# Patient Record
Sex: FEMALE | Race: WHITE
[De-identification: names, ages, dates, MRNs, and addresses within clinical notes are randomized per-mention and may not be internally consistent; named-entity substitution may affect disease eponyms.]

---

## 2018-07-27 ENCOUNTER — HOSPITAL ENCOUNTER (INPATIENT)
Dept: HOSPITAL 96 - M.ERS | Age: 43
LOS: 2 days | Discharge: HOME | DRG: 177 | End: 2018-07-29
Attending: INTERNAL MEDICINE | Admitting: INTERNAL MEDICINE
Payer: MEDICARE

## 2018-07-27 VITALS — SYSTOLIC BLOOD PRESSURE: 90 MMHG | DIASTOLIC BLOOD PRESSURE: 52 MMHG

## 2018-07-27 VITALS — DIASTOLIC BLOOD PRESSURE: 49 MMHG | SYSTOLIC BLOOD PRESSURE: 93 MMHG

## 2018-07-27 VITALS — DIASTOLIC BLOOD PRESSURE: 34 MMHG | SYSTOLIC BLOOD PRESSURE: 114 MMHG

## 2018-07-27 VITALS — HEIGHT: 62.01 IN | WEIGHT: 136 LBS | BODY MASS INDEX: 24.71 KG/M2

## 2018-07-27 VITALS — SYSTOLIC BLOOD PRESSURE: 99 MMHG | DIASTOLIC BLOOD PRESSURE: 46 MMHG

## 2018-07-27 VITALS — SYSTOLIC BLOOD PRESSURE: 138 MMHG | DIASTOLIC BLOOD PRESSURE: 64 MMHG

## 2018-07-27 DIAGNOSIS — Z98.1: ICD-10-CM

## 2018-07-27 DIAGNOSIS — Z87.01: ICD-10-CM

## 2018-07-27 DIAGNOSIS — Z88.8: ICD-10-CM

## 2018-07-27 DIAGNOSIS — E53.8: ICD-10-CM

## 2018-07-27 DIAGNOSIS — G89.29: ICD-10-CM

## 2018-07-27 DIAGNOSIS — Z90.49: ICD-10-CM

## 2018-07-27 DIAGNOSIS — Z88.1: ICD-10-CM

## 2018-07-27 DIAGNOSIS — R65.11: ICD-10-CM

## 2018-07-27 DIAGNOSIS — M47.9: ICD-10-CM

## 2018-07-27 DIAGNOSIS — Z79.2: ICD-10-CM

## 2018-07-27 DIAGNOSIS — J44.0: ICD-10-CM

## 2018-07-27 DIAGNOSIS — Z88.6: ICD-10-CM

## 2018-07-27 DIAGNOSIS — F17.210: ICD-10-CM

## 2018-07-27 DIAGNOSIS — M21.371: ICD-10-CM

## 2018-07-27 DIAGNOSIS — Z91.14: ICD-10-CM

## 2018-07-27 DIAGNOSIS — Z79.899: ICD-10-CM

## 2018-07-27 DIAGNOSIS — Z79.01: ICD-10-CM

## 2018-07-27 DIAGNOSIS — G62.9: ICD-10-CM

## 2018-07-27 DIAGNOSIS — Z86.718: ICD-10-CM

## 2018-07-27 DIAGNOSIS — M21.372: ICD-10-CM

## 2018-07-27 DIAGNOSIS — K21.9: ICD-10-CM

## 2018-07-27 DIAGNOSIS — E87.6: ICD-10-CM

## 2018-07-27 DIAGNOSIS — J15.6: Primary | ICD-10-CM

## 2018-07-27 DIAGNOSIS — J96.21: ICD-10-CM

## 2018-07-27 LAB
ABSOLUTE BASOPHILS: 0.1 THOU/UL (ref 0–0.2)
ABSOLUTE EOSINOPHILS: 0.2 THOU/UL (ref 0–0.7)
ABSOLUTE MONOCYTES: 0.3 THOU/UL (ref 0–1.2)
ALBUMIN SERPL-MCNC: 2.8 G/DL (ref 3.4–5)
ALP SERPL-CCNC: 85 U/L (ref 46–116)
ALT SERPL-CCNC: 18 U/L (ref 30–65)
ANION GAP SERPL CALC-SCNC: 6 MMOL/L (ref 7–16)
AST SERPL-CCNC: 330 U/L (ref 15–37)
BASOPHILS NFR BLD AUTO: 0.5 %
BILIRUB SERPL-MCNC: 0.4 MG/DL
BILIRUB UR-MCNC: NEGATIVE MG/DL
BUN SERPL-MCNC: 10 MG/DL (ref 7–18)
CALCIUM SERPL-MCNC: 9.3 MG/DL (ref 8.5–10.1)
CHLORIDE SERPL-SCNC: 103 MMOL/L (ref 98–107)
CO2 SERPL-SCNC: 31 MMOL/L (ref 21–32)
COLOR UR: YELLOW
CREAT SERPL-MCNC: 0.8 MG/DL (ref 0.6–1.3)
EOSINOPHIL NFR BLD: 1.8 %
GLUCOSE SERPL-MCNC: 112 MG/DL (ref 70–99)
GRANULOCYTES NFR BLD MANUAL: 68.3 %
HCT VFR BLD CALC: 39.1 % (ref 37–47)
HGB BLD-MCNC: 13 GM/DL (ref 12–15)
INR PPP: 1
KETONES UR STRIP-MCNC: NEGATIVE MG/DL
LYMPHOCYTES # BLD: 2.8 THOU/UL (ref 0.8–5.3)
LYMPHOCYTES NFR BLD AUTO: 26.2 %
MCH RBC QN AUTO: 32.5 PG (ref 26–34)
MCHC RBC AUTO-ENTMCNC: 33.4 G/DL (ref 28–37)
MCV RBC: 97.5 FL (ref 80–100)
MONOCYTES NFR BLD: 3.2 %
MPV: 8.8 FL. (ref 7.2–11.1)
NEUTROPHILS # BLD: 7.3 THOU/UL (ref 1.6–8.1)
NUCLEATED RBCS: 0 /100WBC
OPIATES UR-MCNC: POSITIVE NG/ML
PLATELET COUNT*: 177 THOU/UL (ref 150–400)
POTASSIUM SERPL-SCNC: 2.4 MMOL/L (ref 3.5–5.1)
PROT SERPL-MCNC: 6.6 G/DL (ref 6.4–8.2)
PROT UR QL STRIP: (no result)
PROTHROMBIN TIME: 9.4 SECONDS (ref 9.2–11.5)
RBC # BLD AUTO: 4.01 MIL/UL (ref 4.2–5)
RBC # UR STRIP: (no result) /UL
RBC #/AREA URNS HPF: (no result) /HPF (ref 0–2)
RDW-CV: 15.5 % (ref 10.5–14.5)
SODIUM SERPL-SCNC: 140 MMOL/L (ref 136–145)
SP GR UR STRIP: <= 1.005 (ref 1–1.03)
SQUAMOUS: (no result) /LPF (ref 0–3)
THC: POSITIVE
TROPONIN-I LEVEL: <0.06 NG/ML (ref ?–0.06)
URINE CLARITY: CLEAR
URINE GLUCOSE-RANDOM: NEGATIVE
URINE LEUKOCYTES-REFLEX: NEGATIVE
URINE NITRITE-REFLEX: NEGATIVE
URINE WBC-REFLEX: (no result) /HPF (ref 0–5)
UROBILINOGEN UR STRIP-ACNC: 0.2 E.U./DL (ref 0.2–1)
WBC # BLD AUTO: 10.7 THOU/UL (ref 4–11)

## 2018-07-27 NOTE — NUR
O2 SAT WAS 85-88% ON ROOM AIR.  PT HAS NOT BEEN ON O2 AT HOME "IN A FEW DAYS". 
 NOW REPORTS THAT SHE IS SUPPOSED TO BE ON O2 DURING THE DAY FREQUENTLY, BUT
HAS CHANGED INSURANCE & HAS NO PRIMARY CARE PHYSICIAN TO ORDER OXYGEN.  STATES
SHE STILL SMOKES.  O2 4L NC APPLIED & NOW O2 SAT 92%.

## 2018-07-27 NOTE — NUR
PT ARRIVED ON THE FLOOR FROM ER. VITALS TAKEN AND CARDIAC MONITER ON. PT WANTS
TO FILL OUT AN ADVANCE DIRECTIVE. STEPHANY FROM CT CALLED AND WANTS PT DOWN FOR
STAT CT PER DR LACHMAN.

## 2018-07-27 NOTE — NUR
TO  BATHROOM VIA W/C.  ASSISTED TO TOILET.  INSTRUCTED ON URINE COLLECTION &
CALL LIGHT USE.  VERBALIZED UNDERSTANDING.

## 2018-07-28 VITALS — DIASTOLIC BLOOD PRESSURE: 68 MMHG | SYSTOLIC BLOOD PRESSURE: 123 MMHG

## 2018-07-28 VITALS — SYSTOLIC BLOOD PRESSURE: 94 MMHG | DIASTOLIC BLOOD PRESSURE: 50 MMHG

## 2018-07-28 VITALS — SYSTOLIC BLOOD PRESSURE: 93 MMHG | DIASTOLIC BLOOD PRESSURE: 40 MMHG

## 2018-07-28 VITALS — SYSTOLIC BLOOD PRESSURE: 85 MMHG | DIASTOLIC BLOOD PRESSURE: 40 MMHG

## 2018-07-28 VITALS — SYSTOLIC BLOOD PRESSURE: 89 MMHG | DIASTOLIC BLOOD PRESSURE: 47 MMHG

## 2018-07-28 VITALS — DIASTOLIC BLOOD PRESSURE: 64 MMHG | SYSTOLIC BLOOD PRESSURE: 99 MMHG

## 2018-07-28 LAB
ABSOLUTE BASOPHILS: 0 THOU/UL (ref 0–0.2)
ABSOLUTE EOSINOPHILS: 0.3 THOU/UL (ref 0–0.7)
ABSOLUTE MONOCYTES: 0.4 THOU/UL (ref 0–1.2)
ANION GAP SERPL CALC-SCNC: 7 MMOL/L (ref 7–16)
BASOPHILS NFR BLD AUTO: 0.4 %
BUN SERPL-MCNC: 9 MG/DL (ref 7–18)
CALCIUM SERPL-MCNC: 8.2 MG/DL (ref 8.5–10.1)
CHLORIDE SERPL-SCNC: 107 MMOL/L (ref 98–107)
CO2 SERPL-SCNC: 28 MMOL/L (ref 21–32)
CREAT SERPL-MCNC: 0.7 MG/DL (ref 0.6–1.3)
EOSINOPHIL NFR BLD: 3.2 %
GLUCOSE SERPL-MCNC: 115 MG/DL (ref 70–99)
GRANULOCYTES NFR BLD MANUAL: 63.4 %
HCT VFR BLD CALC: 37.6 % (ref 37–47)
HGB BLD-MCNC: 12.3 GM/DL (ref 12–15)
LYMPHOCYTES # BLD: 3.1 THOU/UL (ref 0.8–5.3)
LYMPHOCYTES NFR BLD AUTO: 29.2 %
MCH RBC QN AUTO: 31.8 PG (ref 26–34)
MCHC RBC AUTO-ENTMCNC: 32.6 G/DL (ref 28–37)
MCV RBC: 97.4 FL (ref 80–100)
MONOCYTES NFR BLD: 3.8 %
MPV: 9.3 FL. (ref 7.2–11.1)
NEUTROPHILS # BLD: 6.7 THOU/UL (ref 1.6–8.1)
NUCLEATED RBCS: 0 /100WBC
PLATELET COUNT*: 153 THOU/UL (ref 150–400)
POTASSIUM SERPL-SCNC: 3.5 MMOL/L (ref 3.5–5.1)
RBC # BLD AUTO: 3.86 MIL/UL (ref 4.2–5)
RDW-CV: 15.7 % (ref 10.5–14.5)
SODIUM SERPL-SCNC: 142 MMOL/L (ref 136–145)
WBC # BLD AUTO: 10.5 THOU/UL (ref 4–11)

## 2018-07-28 NOTE — NUR
OT SCREENING: SCREEN PERFORMED FOR SKILLED OT EVAL. PT STATES SHE IS NOT
HAVING ANY PROBLEMS WITH SELF CARES OR HER UB, ONLY HER AMBULATION DUE TO
NUMBNESS IN HER BLE FROM KNEE TO FOOT. PT STATES THAT SHE IS ABLE TO STAND
AND PERFORM SELF CARE TASKS, IT IS THE MOVING THAT IS HER PROBLEM. PT STATES
SHE DOES NOT FEEL SHE NEEDS SKILLED OT SERVICES. CARE DEFERRED TO P.T. TO
ADDRESS LE WEAKNESS, BALANCE AND GAIT. PLEASE RE-CONSULT SHOULD PT'S STATUS OF
FUNCTION CHANGE.  THANK YOU

## 2018-07-28 NOTE — NUR
ASSUMED PT CARE AT 1930. PT DOWN IN RADIOLOGY FOR CT AT START OF SHIFT. PT
BACK TO ROOM AT 1945. PT C/O PAIN, SPOKE TO DR. DAMON IN REGARDS TO
PATIENT'S HOME MEDICATIONS. NEW ORDERS RECEIVED. PT REFUSED STRAGHT CATH,
STATED SHE WANTED TO TRY TO PEE ON HER OWN. UA COLLECTED AND SENT TO LAB PRIOR
TO MEDICATION ADMINISTRATION. PT CONTINUES TO C/O WEAKNESS,NUMBNESS TO BLE. PT
ALSO STATES SHE HAS BEEN WITHOUT HER OXYGEN FOR THE PAST 3 WEEKS D/T MED
INSURANCE CHANGES.
HIGH FALL PRECAUTIONS WITHIN REACH, HOURLY ROUNDING COMPLETED. CALL LIGHT
WITHIN REACH.

## 2018-07-28 NOTE — NUR
Pt is A&O. Resides at home with her  and children. Independent with
ADLs. Pt has a walker and wc at home that she can use as needed. Pt states she
normally wears home o2 through [employer], but stated that with her insurance
change, she no longer has home o2 and will need a Dr to reorder. CM to f/u on
Monday. Hx of HH. No hx of SNF. Goal is to return home at dc. If Pt needs home
o2 arranged, Pt will need an ex ox. Following.

## 2018-07-29 VITALS — DIASTOLIC BLOOD PRESSURE: 38 MMHG | SYSTOLIC BLOOD PRESSURE: 100 MMHG

## 2018-07-29 VITALS — SYSTOLIC BLOOD PRESSURE: 92 MMHG | DIASTOLIC BLOOD PRESSURE: 42 MMHG

## 2018-07-29 VITALS — DIASTOLIC BLOOD PRESSURE: 53 MMHG | SYSTOLIC BLOOD PRESSURE: 96 MMHG

## 2018-07-29 VITALS — DIASTOLIC BLOOD PRESSURE: 41 MMHG | SYSTOLIC BLOOD PRESSURE: 86 MMHG

## 2018-07-29 LAB
ABSOLUTE BASOPHILS: 0.1 THOU/UL (ref 0–0.2)
ABSOLUTE EOSINOPHILS: 0.2 THOU/UL (ref 0–0.7)
ABSOLUTE MONOCYTES: 0.2 THOU/UL (ref 0–1.2)
ALBUMIN SERPL-MCNC: 2.3 G/DL (ref 3.4–5)
ALP SERPL-CCNC: 57 U/L (ref 46–116)
ALT SERPL-CCNC: 19 U/L (ref 30–65)
ANION GAP SERPL CALC-SCNC: 8 MMOL/L (ref 7–16)
AST SERPL-CCNC: 249 U/L (ref 15–37)
BASOPHILS NFR BLD AUTO: 1.1 %
BILIRUB SERPL-MCNC: 0.2 MG/DL
BUN SERPL-MCNC: 7 MG/DL (ref 7–18)
CALCIUM SERPL-MCNC: 7.9 MG/DL (ref 8.5–10.1)
CHLORIDE SERPL-SCNC: 109 MMOL/L (ref 98–107)
CO2 SERPL-SCNC: 28 MMOL/L (ref 21–32)
CREAT SERPL-MCNC: 0.6 MG/DL (ref 0.6–1.3)
EOSINOPHIL NFR BLD: 4.2 %
GLUCOSE SERPL-MCNC: 121 MG/DL (ref 70–99)
GRANULOCYTES NFR BLD MANUAL: 53.7 %
HCT VFR BLD CALC: 35.8 % (ref 37–47)
HGB BLD-MCNC: 11.7 GM/DL (ref 12–15)
LYMPHOCYTES # BLD: 1.9 THOU/UL (ref 0.8–5.3)
LYMPHOCYTES NFR BLD AUTO: 36.8 %
MCH RBC QN AUTO: 32.1 PG (ref 26–34)
MCHC RBC AUTO-ENTMCNC: 32.6 G/DL (ref 28–37)
MCV RBC: 98.4 FL (ref 80–100)
MONOCYTES NFR BLD: 4.2 %
MPV: 9.6 FL. (ref 7.2–11.1)
NEUTROPHILS # BLD: 2.7 THOU/UL (ref 1.6–8.1)
NUCLEATED RBCS: 0 /100WBC
PLATELET COUNT*: 127 THOU/UL (ref 150–400)
POTASSIUM SERPL-SCNC: 3.3 MMOL/L (ref 3.5–5.1)
PROT SERPL-MCNC: 5.4 G/DL (ref 6.4–8.2)
RBC # BLD AUTO: 3.64 MIL/UL (ref 4.2–5)
RDW-CV: 15.6 % (ref 10.5–14.5)
SODIUM SERPL-SCNC: 145 MMOL/L (ref 136–145)
WBC # BLD AUTO: 5.1 THOU/UL (ref 4–11)

## 2018-07-29 NOTE — NUR
PATIENT RESTED THROUGH THE NIGHT. UP WITH ASSIST/USE OF WHEELCHAIR TO
BATHROOM, CONTINUES TO STATE THAT HER LEGS ARE NUMB. DENIES PAIN OR NEEDS.
REPORT GIVEN TO ONCOMING NURSE. WILL MONITOR.

## 2018-07-29 NOTE — NUR
ORDER RECEIVED TO DISCHARGE TETO HOME TO SELF CARE WITH SUPPLEMENTAL
OXYGEN.  JILLIAN NOTIFIED THAT TETO REYES REQUIRE 2L PER NASAL CANULA AT
REST AND WITH AMBULATION AND THAT ORDER FOR CONCENTRATOR AND PORTABLE O2 WAS
WRITTEN.  MED REC, MEDICATION EDUCATION, AND NEED FOR FOLLOW UP APPOINTMNETS
WITH PRIMARY CARE AND DR HERNANDES OFFICE COVERED AND STATED AS UNDERSTOOD BY
PATIENT.  IV AND TELEMETRY PACK REMOVED.  PATINET ESCORTED VIA PERSONNAL
WHEELCJHAIR AND PORTABLE OXYGEN TO AWATING CAR WITH FAMILY PRESENT.  PRAKASH GAYTAN COMPLETD FOR PATIENT SAFETY. DC TIME OF 16:00.

## 2018-11-16 ENCOUNTER — HOSPITAL ENCOUNTER (EMERGENCY)
Dept: HOSPITAL 96 - M.ERS | Age: 43
Discharge: HOME | End: 2018-11-16
Payer: MEDICARE

## 2018-11-16 VITALS — HEIGHT: 55 IN

## 2018-11-16 VITALS — SYSTOLIC BLOOD PRESSURE: 139 MMHG | DIASTOLIC BLOOD PRESSURE: 75 MMHG

## 2018-11-16 DIAGNOSIS — Z86.718: ICD-10-CM
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
## 2018-11-16 DIAGNOSIS — F17.210: ICD-10-CM

## 2018-11-16 DIAGNOSIS — T40.2X1A: Primary | ICD-10-CM

## 2018-11-16 DIAGNOSIS — Z88.6: ICD-10-CM

## 2018-11-16 DIAGNOSIS — J44.9: ICD-10-CM

## 2018-11-16 DIAGNOSIS — Z88.8: ICD-10-CM

## 2018-11-16 DIAGNOSIS — Z87.01: ICD-10-CM

## 2018-11-16 DIAGNOSIS — R41.82: ICD-10-CM

## 2018-11-16 LAB
ABSOLUTE BASOPHILS: 0.1 THOU/UL (ref 0–0.2)
ABSOLUTE EOSINOPHILS: 0.2 THOU/UL (ref 0–0.7)
ABSOLUTE MONOCYTES: 0.4 THOU/UL (ref 0–1.2)
ALBUMIN SERPL-MCNC: 3.1 G/DL (ref 3.4–5)
ALP SERPL-CCNC: 60 U/L (ref 46–116)
ALT SERPL-CCNC: 10 U/L (ref 30–65)
ANION GAP SERPL CALC-SCNC: 7 MMOL/L (ref 7–16)
APAP SERPL-MCNC: < 2 UG/ML (ref 10–30)
APTT BLD: 25.6 SECONDS (ref 25–31.3)
AST SERPL-CCNC: 14 U/L (ref 15–37)
BASOPHILS NFR BLD AUTO: 1.3 %
BENZODIAZ UR-MCNC: POSITIVE UG/L
BILIRUB SERPL-MCNC: 0.2 MG/DL
BILIRUB UR-MCNC: NEGATIVE MG/DL
BUN SERPL-MCNC: 19 MG/DL (ref 7–18)
CALCIUM SERPL-MCNC: 8.8 MG/DL (ref 8.5–10.1)
CHLORIDE SERPL-SCNC: 111 MMOL/L (ref 98–107)
CK-MB MASS: < 0.5 NG/ML
CO2 SERPL-SCNC: 28 MMOL/L (ref 21–32)
COLOR UR: YELLOW
CREAT SERPL-MCNC: 0.9 MG/DL (ref 0.6–1.3)
EOSINOPHIL NFR BLD: 2.7 %
ETHANOL SERPL-MCNC: < 10 MG/DL (ref ?–10)
GLUCOSE SERPL-MCNC: 88 MG/DL (ref 70–99)
GRANULOCYTES NFR BLD MANUAL: 59 %
HCT VFR BLD CALC: 41.8 % (ref 37–47)
HGB BLD-MCNC: 13.5 GM/DL (ref 12–15)
INR PPP: 0.9
KETONES UR STRIP-MCNC: NEGATIVE MG/DL
LYMPHOCYTES # BLD: 2.8 THOU/UL (ref 0.8–5.3)
LYMPHOCYTES NFR BLD AUTO: 32.3 %
MCH RBC QN AUTO: 31 PG (ref 26–34)
MCHC RBC AUTO-ENTMCNC: 32.3 G/DL (ref 28–37)
MCV RBC: 95.8 FL (ref 80–100)
MONOCYTES NFR BLD: 4.7 %
MPV: 9.1 FL. (ref 7.2–11.1)
NEUTROPHILS # BLD: 5.1 THOU/UL (ref 1.6–8.1)
NT-PRO BRAIN NAT PEPTIDE: 57 PG/ML (ref ?–300)
NUCLEATED RBCS: 0 /100WBC
OPIATES UR-MCNC: POSITIVE NG/ML
PLATELET COUNT*: 147 THOU/UL (ref 150–400)
POTASSIUM SERPL-SCNC: 3.9 MMOL/L (ref 3.5–5.1)
PROT SERPL-MCNC: 6.5 G/DL (ref 6.4–8.2)
PROT UR QL STRIP: NEGATIVE
PROTHROMBIN TIME: 9.7 SECONDS (ref 9.2–11.5)
RBC # BLD AUTO: 4.36 MIL/UL (ref 4.2–5)
RBC # UR STRIP: NEGATIVE /UL
RDW-CV: 17.3 % (ref 10.5–14.5)
SALICYLATES SERPL-MCNC: 3.6 MG/DL (ref 2.8–20)
SODIUM SERPL-SCNC: 146 MMOL/L (ref 136–145)
SP GR UR STRIP: 1.01 (ref 1–1.03)
TROPONIN-I LEVEL: <0.06 NG/ML (ref ?–0.06)
URINE CLARITY: CLEAR
URINE GLUCOSE-RANDOM: NEGATIVE
URINE LEUKOCYTES-REFLEX: NEGATIVE
URINE NITRITE-REFLEX: NEGATIVE
UROBILINOGEN UR STRIP-ACNC: 0.2 E.U./DL (ref 0.2–1)
WBC # BLD AUTO: 8.7 THOU/UL (ref 4–11)

## 2018-11-17 NOTE — EKG
[place] [address]
Phone:  [phone]                     ELECTROCARDIOGRAM REPORT      
_______________________________________________________________________________
 
Name:       JUSTIN MACDONALD           [facility]                      [id number]      Account #:      [id number]  
Admission:  18     Attend Phys:                         
Discharge:  18     Date of Birth:  [birth year]  
         Report #: [id number]
    [id number]
_______________________________________________________________________________
THIS REPORT FOR:  //name//                      
 
                         [facility] ED
                                       
Test Date:    2018               Test Time:    20:06:16
Pat Name:     JUSTIN MACDONALD         Department:   
Patient ID:   [id number]            Room:          
Gender:       F                        Technician:   AL
:          1975               Requested By: Mau Muñoz
Order Number: [id number] MD:   Horace Lennon
                                 Measurements
Intervals                              Axis          
Rate:         94                       P:            65
MS:           177                      QRS:          85
QRSD:         96                       T:            60
QT:           355                                    
QTc:          444                                    
                           Interpretive Statements
Sinus rhythm
RSR' in V1 or V2, right VCD or RVH
Compared to ECG 2018 16:11:00
Sinus tachycardia no longer present
 
Electronically Signed On 2018 10:15:36 CST by Horace Lennon
[email or web address]
 
 
 
 
 
 
 
 
 
 
 
 
 
 
 
 
 
 
  <ELECTRONICALLY SIGNED>
                                           By: Horace Lennon MD, [facility]      
  18     1015
D: 18   _____________________________________
T: 18   Horace Lennon MD, FAC        /EPI

## 2018-11-18 ENCOUNTER — HOSPITAL ENCOUNTER (INPATIENT)
Dept: HOSPITAL 96 - M.ERS | Age: 43
LOS: 1 days | Discharge: LEFT BEFORE DISCHARGE | DRG: 871 | End: 2018-11-19
Attending: INTERNAL MEDICINE | Admitting: INTERNAL MEDICINE
Payer: MEDICARE

## 2018-11-18 VITALS — SYSTOLIC BLOOD PRESSURE: 96 MMHG | DIASTOLIC BLOOD PRESSURE: 32 MMHG

## 2018-11-18 VITALS — DIASTOLIC BLOOD PRESSURE: 60 MMHG | SYSTOLIC BLOOD PRESSURE: 111 MMHG

## 2018-11-18 VITALS — DIASTOLIC BLOOD PRESSURE: 58 MMHG | SYSTOLIC BLOOD PRESSURE: 108 MMHG

## 2018-11-18 VITALS — SYSTOLIC BLOOD PRESSURE: 104 MMHG | DIASTOLIC BLOOD PRESSURE: 49 MMHG

## 2018-11-18 VITALS — DIASTOLIC BLOOD PRESSURE: 55 MMHG | SYSTOLIC BLOOD PRESSURE: 108 MMHG

## 2018-11-18 VITALS — SYSTOLIC BLOOD PRESSURE: 105 MMHG | DIASTOLIC BLOOD PRESSURE: 59 MMHG

## 2018-11-18 VITALS — SYSTOLIC BLOOD PRESSURE: 91 MMHG | DIASTOLIC BLOOD PRESSURE: 42 MMHG

## 2018-11-18 VITALS — DIASTOLIC BLOOD PRESSURE: 54 MMHG | SYSTOLIC BLOOD PRESSURE: 108 MMHG

## 2018-11-18 VITALS — SYSTOLIC BLOOD PRESSURE: 114 MMHG | DIASTOLIC BLOOD PRESSURE: 62 MMHG

## 2018-11-18 VITALS — DIASTOLIC BLOOD PRESSURE: 48 MMHG | SYSTOLIC BLOOD PRESSURE: 102 MMHG

## 2018-11-18 VITALS — DIASTOLIC BLOOD PRESSURE: 97 MMHG | SYSTOLIC BLOOD PRESSURE: 115 MMHG

## 2018-11-18 VITALS — DIASTOLIC BLOOD PRESSURE: 62 MMHG | SYSTOLIC BLOOD PRESSURE: 113 MMHG

## 2018-11-18 VITALS — DIASTOLIC BLOOD PRESSURE: 51 MMHG | SYSTOLIC BLOOD PRESSURE: 111 MMHG

## 2018-11-18 VITALS — SYSTOLIC BLOOD PRESSURE: 115 MMHG | DIASTOLIC BLOOD PRESSURE: 59 MMHG

## 2018-11-18 VITALS — SYSTOLIC BLOOD PRESSURE: 117 MMHG | DIASTOLIC BLOOD PRESSURE: 60 MMHG

## 2018-11-18 VITALS — SYSTOLIC BLOOD PRESSURE: 103 MMHG | DIASTOLIC BLOOD PRESSURE: 47 MMHG

## 2018-11-18 VITALS — BODY MASS INDEX: 25.98 KG/M2 | HEIGHT: 62.99 IN | WEIGHT: 146.61 LBS

## 2018-11-18 VITALS — DIASTOLIC BLOOD PRESSURE: 59 MMHG | SYSTOLIC BLOOD PRESSURE: 110 MMHG

## 2018-11-18 VITALS — DIASTOLIC BLOOD PRESSURE: 50 MMHG | SYSTOLIC BLOOD PRESSURE: 97 MMHG

## 2018-11-18 DIAGNOSIS — E87.6: ICD-10-CM

## 2018-11-18 DIAGNOSIS — Z88.6: ICD-10-CM

## 2018-11-18 DIAGNOSIS — T40.2X1A: ICD-10-CM

## 2018-11-18 DIAGNOSIS — A41.9: Primary | ICD-10-CM

## 2018-11-18 DIAGNOSIS — Z88.8: ICD-10-CM
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
## 2018-11-18 DIAGNOSIS — K21.9: ICD-10-CM

## 2018-11-18 DIAGNOSIS — Z87.01: ICD-10-CM

## 2018-11-18 DIAGNOSIS — I27.20: ICD-10-CM

## 2018-11-18 DIAGNOSIS — E87.0: ICD-10-CM

## 2018-11-18 DIAGNOSIS — J96.21: ICD-10-CM

## 2018-11-18 DIAGNOSIS — J44.9: ICD-10-CM

## 2018-11-18 DIAGNOSIS — Z86.718: ICD-10-CM

## 2018-11-18 DIAGNOSIS — J69.0: ICD-10-CM

## 2018-11-18 DIAGNOSIS — Z90.49: ICD-10-CM

## 2018-11-18 DIAGNOSIS — M19.90: ICD-10-CM

## 2018-11-18 LAB
ABSOLUTE EOSINOPHILS: 0.2 THOU/UL (ref 0–0.7)
ABSOLUTE MONOCYTES: 0.9 THOU/UL (ref 0–1.2)
ALBUMIN SERPL-MCNC: 2.4 G/DL (ref 3.4–5)
ALBUMIN SERPL-MCNC: 2.9 G/DL (ref 3.4–5)
ALP SERPL-CCNC: 67 U/L (ref 46–116)
ALP SERPL-CCNC: 73 U/L (ref 46–116)
ALT SERPL-CCNC: 12 U/L (ref 30–65)
ALT SERPL-CCNC: 14 U/L (ref 30–65)
ANION GAP SERPL CALC-SCNC: 11 MMOL/L (ref 7–16)
ANION GAP SERPL CALC-SCNC: 12 MMOL/L (ref 7–16)
APAP SERPL-MCNC: < 2 UG/ML (ref 10–30)
APTT BLD: 23.1 SECONDS (ref 25–31.3)
AST SERPL-CCNC: 17 U/L (ref 15–37)
AST SERPL-CCNC: 21 U/L (ref 15–37)
BE: -4.3 MMOL/L
BE: -6.6 MMOL/L
BENZODIAZ UR-MCNC: POSITIVE UG/L
BILIRUB SERPL-MCNC: 0.3 MG/DL
BILIRUB SERPL-MCNC: 0.4 MG/DL
BILIRUB UR-MCNC: NEGATIVE MG/DL
BUN SERPL-MCNC: 14 MG/DL (ref 7–18)
BUN SERPL-MCNC: 20 MG/DL (ref 7–18)
CALCIUM SERPL-MCNC: 7.4 MG/DL (ref 8.5–10.1)
CALCIUM SERPL-MCNC: 8 MG/DL (ref 8.5–10.1)
CHLORIDE SERPL-SCNC: 109 MMOL/L (ref 98–107)
CHLORIDE SERPL-SCNC: 113 MMOL/L (ref 98–107)
CO2 SERPL-SCNC: 22 MMOL/L (ref 21–32)
CO2 SERPL-SCNC: 25 MMOL/L (ref 21–32)
COLOR UR: YELLOW
CREAT SERPL-MCNC: 0.7 MG/DL (ref 0.6–1.3)
CREAT SERPL-MCNC: 1.2 MG/DL (ref 0.6–1.3)
EOSINOPHIL NFR BLD: 1 %
ETHANOL SERPL-MCNC: < 10 MG/DL (ref ?–10)
GLUCOSE SERPL-MCNC: 103 MG/DL (ref 70–99)
GLUCOSE SERPL-MCNC: 129 MG/DL (ref 70–99)
GRANULOCYTES NFR BLD MANUAL: 85 %
HCO3 BLD-SCNC: 19.1 MMOL/L (ref 22–26)
HCO3 BLD-SCNC: 21.2 MMOL/L (ref 22–26)
HCT VFR BLD CALC: 39.2 % (ref 37–47)
HGB BLD-MCNC: 12.5 GM/DL (ref 12–15)
INR PPP: 1.3
KETONES UR STRIP-MCNC: NEGATIVE MG/DL
LYMPHOCYTES # BLD: 1.9 THOU/UL (ref 0.8–5.3)
LYMPHOCYTES NFR BLD AUTO: 9 %
MCH RBC QN AUTO: 30.5 PG (ref 26–34)
MCHC RBC AUTO-ENTMCNC: 31.8 G/DL (ref 28–37)
MCV RBC: 95.8 FL (ref 80–100)
MONOCYTES NFR BLD: 4 %
MPV: 9.5 FL. (ref 7.2–11.1)
NEUTROPHILS # BLD: 18.5 THOU/UL (ref 1.6–8.1)
NEUTS BAND NFR BLD: 1 %
NUCLEATED RBCS: 0 /100WBC
OPIATES UR-MCNC: POSITIVE NG/ML
PCO2 BLD: 38.6 MMHG (ref 35–45)
PCO2 BLD: 40.3 MMHG (ref 35–45)
PLATELET # BLD EST: ADEQUATE 10*3/UL
PLATELET COUNT*: 165 THOU/UL (ref 150–400)
PO2 BLD: 256.2 MMHG (ref 75–100)
PO2 BLD: 56.2 MMHG (ref 75–100)
POTASSIUM SERPL-SCNC: 3.3 MMOL/L (ref 3.5–5.1)
POTASSIUM SERPL-SCNC: 3.4 MMOL/L (ref 3.5–5.1)
PROT SERPL-MCNC: 5 G/DL (ref 6.4–8.2)
PROT SERPL-MCNC: 6.2 G/DL (ref 6.4–8.2)
PROT UR QL STRIP: (no result)
PROTHROMBIN TIME: 13.3 SECONDS (ref 9.2–11.5)
RBC # BLD AUTO: 4.09 MIL/UL (ref 4.2–5)
RBC # UR STRIP: NEGATIVE /UL
RDW-CV: 17 % (ref 10.5–14.5)
SALICYLATES SERPL-MCNC: 3.7 MG/DL (ref 2.8–20)
SODIUM SERPL-SCNC: 146 MMOL/L (ref 136–145)
SODIUM SERPL-SCNC: 146 MMOL/L (ref 136–145)
SP GR UR STRIP: >= 1.03 (ref 1–1.03)
TOXIC GRANULES BLD QL SMEAR: (no result)
TROPONIN-I LEVEL: 0.16 NG/ML (ref ?–0.06)
URINE CLARITY: CLEAR
URINE GLUCOSE-RANDOM: NEGATIVE
URINE LEUKOCYTES-REFLEX: NEGATIVE
URINE NITRITE-REFLEX: NEGATIVE
UROBILINOGEN UR STRIP-ACNC: 0.2 E.U./DL (ref 0.2–1)
WBC # BLD AUTO: 21.5 THOU/UL (ref 4–11)

## 2018-11-18 NOTE — NUR
PT AGITATED, THRASHING LEGS, PULLING AGAINST BILAT WRIST RETRAINTS, ATTEMPTING
TO GET OOB, ORIENTED PLACE, TIME, AND SITUATION, FIGHTING AGAINST ORAL CARE,
NODDING HEAD NO CONSISTANLY WITH ANY TYPE OF CARES, INCREASED FENTANYL GTT
FROM 50MCG/HR TO 75MCG/HR, WILL CONTINUE TO MONITOR

## 2018-11-18 NOTE — NUR
PATIENT REMAINS ON VENT INCREASED SEDATION.  AT BEDSIDE. VERY ALERT AT
TIMES WILL MOVE SELF UP IN BED. AFEBRILE SUCTIONING THIN FELIZ SECRETIONS.

## 2018-11-18 NOTE — EKG
[place] [address]
Phone:  [phone]                     ELECTROCARDIOGRAM REPORT      
_______________________________________________________________________________
 
Name:       JUSTIN MACDONALD           Room:           [address]    ADM IN  
M.R.#:  [id number]      Account #:      [id number]  
Admission:  18     Attend Phys:    BURAK Burgess
Discharge:               Date of Birth:  [birth year]  
         Report #: [id number]
    [id number]
_______________________________________________________________________________
THIS REPORT FOR:  //name//                      
 
                         [facility] ED
                                       
Test Date:    2018               Test Time:    04:09:41
Pat Name:     JUSTIN MACDONALD         Department:   
Patient ID:   [id number]            Room:         [facility]
Gender:       F                        Technician:   KATIE
:          1975               Requested By: Mau Muñoz
Order Number: [id number] MD:   Horace Lennon
                                 Measurements
Intervals                              Axis          
Rate:         109                      P:            147
CT:           143                      QRS:          148
QRSD:         100                      T:            
QT:           344                                    
QTc:          464                                    
                           Interpretive Statements
Sinus tachycardia
Probable left atrial enlargement
Right axis deviation
Borderline low voltage, extremity leads
Nonspecific T abnormalities, lateral leads
Lead(s) II were not used for morphology analysis
Compared to ECG 2018 20:06:16
Right-axis deviation now present
T-wave abnormality now present
Sinus rhythm no longer present
 
 
Electronically Signed On 2018 14:26:11 CST by Horace Lennon
[email or web address]
 
 
 
 
 
 
 
 
 
 
 
  <ELECTRONICALLY SIGNED>
                                           By: Horace Lennon MD, FACC      
  18     1426
D: 189   _____________________________________
T: 18 0409   Horace Lennon MD, [facility]        /EPI

## 2018-11-19 VITALS — DIASTOLIC BLOOD PRESSURE: 67 MMHG | SYSTOLIC BLOOD PRESSURE: 119 MMHG

## 2018-11-19 VITALS — DIASTOLIC BLOOD PRESSURE: 61 MMHG | SYSTOLIC BLOOD PRESSURE: 116 MMHG

## 2018-11-19 VITALS — SYSTOLIC BLOOD PRESSURE: 118 MMHG | DIASTOLIC BLOOD PRESSURE: 57 MMHG

## 2018-11-19 VITALS — SYSTOLIC BLOOD PRESSURE: 112 MMHG | DIASTOLIC BLOOD PRESSURE: 59 MMHG

## 2018-11-19 VITALS — DIASTOLIC BLOOD PRESSURE: 59 MMHG | SYSTOLIC BLOOD PRESSURE: 111 MMHG

## 2018-11-19 VITALS — DIASTOLIC BLOOD PRESSURE: 44 MMHG | SYSTOLIC BLOOD PRESSURE: 81 MMHG

## 2018-11-19 VITALS — DIASTOLIC BLOOD PRESSURE: 51 MMHG | SYSTOLIC BLOOD PRESSURE: 117 MMHG

## 2018-11-19 VITALS — DIASTOLIC BLOOD PRESSURE: 59 MMHG | SYSTOLIC BLOOD PRESSURE: 107 MMHG

## 2018-11-19 VITALS — SYSTOLIC BLOOD PRESSURE: 117 MMHG | DIASTOLIC BLOOD PRESSURE: 66 MMHG

## 2018-11-19 LAB
ANION GAP SERPL CALC-SCNC: 13 MMOL/L (ref 7–16)
BE: -4.7 MMOL/L
BUN SERPL-MCNC: 10 MG/DL (ref 7–18)
CALCIUM SERPL-MCNC: 7.8 MG/DL (ref 8.5–10.1)
CHLORIDE SERPL-SCNC: 112 MMOL/L (ref 98–107)
CO2 SERPL-SCNC: 22 MMOL/L (ref 21–32)
CREAT SERPL-MCNC: 0.5 MG/DL (ref 0.6–1.3)
GLUCOSE SERPL-MCNC: 154 MG/DL (ref 70–99)
HCO3 BLD-SCNC: 20.5 MMOL/L (ref 22–26)
HCT VFR BLD CALC: 33.2 % (ref 37–47)
HGB BLD-MCNC: 10.7 GM/DL (ref 12–15)
MCH RBC QN AUTO: 30.9 PG (ref 26–34)
MCHC RBC AUTO-ENTMCNC: 32.2 G/DL (ref 28–37)
MCV RBC: 95.9 FL (ref 80–100)
MPV: 9.8 FL. (ref 7.2–11.1)
PCO2 BLD: 38.3 MMHG (ref 35–45)
PLATELET COUNT*: 120 THOU/UL (ref 150–400)
PO2 BLD: 105.5 MMHG (ref 75–100)
POTASSIUM SERPL-SCNC: 3.5 MMOL/L (ref 3.5–5.1)
RBC # BLD AUTO: 3.46 MIL/UL (ref 4.2–5)
RDW-CV: 16.8 % (ref 10.5–14.5)
SODIUM SERPL-SCNC: 147 MMOL/L (ref 136–145)
WBC # BLD AUTO: 5.3 THOU/UL (ref 4–11)

## 2018-11-19 NOTE — NUR
PATIENT DECLARED COMPITENT BY PSCHYCOLOGIST. DR AVILA NOTIFIED PT 
HERE ASSISTED TO CAR PER WHEELCHAIR PT SIGNED AMA DOCUMENT.

## 2018-11-19 NOTE — NUR
SLOW PROGRESSION TOWARDS GOALS, AGITATED AND RESTLESS INTERMITTENT MOST OF
SHIFT, PULLING AGAINST RESTRAINTS, ABLE TO REMOVE WRIST X1 FROM SOFT WRIST
RESTRAINT, KICKING LEGS IN BED, ISABELSSIVLEY NODDING HEAD NO WITH ANY TYPE OF
INLINE SUCTIONING OR ORAL CARE, ATIVAN 2MG IVP GIVEN PRN X2 WITH SOMEWHAT
HELPFUL RESULTS, HALIDOL 2MG IVP GIVEN X2 PRN WITH MINIMAL DECREASE IN
AGITATION, EMOTIONAL SUPPORT PROVIDED, VERSED GTT TITRATED MAX ORDERED DOSAGE
150MCG/HR, VERSED GTT REMAINS MAX ORDERED DOSE 10MG/HR, FIO2 TITRATED DOWN BY
RT FROM 50% TO 30%, SAO2 REMAINS =>97%, BILAT SOFT ANKLE RESTRAINTS INITATED
TO KEEP PT SAFE, SB/SR TRACING CARDIAC MONITOR, NA ELEVATED 147 THIS AM, PAGE
PLACED DR CANNON CLARIFY CONTINUATION NS THAT IS TRANSFUSING VIA INFUSION
PUMP AT 100CC/HR, TTT SCHEDULED AT 0800 THIS AM, PT REMAINS RESLTESS WITH ANY
TYPE OF STIMULI.

## 2018-11-19 NOTE — NUR
ICU ROUNDS:
RN IN-CHARGE OF THE PATIENT INFORMS THAT PATIENT IS NOW EXTUBATED. LASIX AND
POTASSIUM GIVEN. PATIENT COMFORTABLE. CM WILL REMAIN AVIALABLE TO ASSIST AND
FOLLOW AS NEEDED.

## 2018-11-19 NOTE — CON
[address]  [address]                    CONSULTATION                  
_______________________________________________________________________________
 
Name:       JUSTIN MACDONALD           Room:           [address]    ADM IN  
M.R.#:  [id number]      Account #:      [id number]  
Admission:  11/18/18     Attend Phys:    BURAK Burgess
Discharge:               Date of Birth:  [birth year]  
         Report #: [id number]
                                                                     [id number]  
_______________________________________________________________________________
THIS REPORT FOR:  //name//                      
 
CC: [facility] physician/PCP
    Violet Ramirez
 
REASON FOR CONSULTATION:  Respiratory failure.
 
HISTORY OF PRESENT ILLNESS:  The patient was intubated on the vent, during my
evaluation, although she was easily arousable, but obviously did not participate
in the history.  She is a 43-year-old female patient with a mention of COPD in
her records and chronic respiratory failure.  She presented to the ER with
mental status change.  Apparently, she is on chronic pain medication, and she
had an intentional drug overdose.  The family member tried Narcan at home
without any help.  She was brought into the ER.  Apparently, she was seen also
the ER day before, and she was offered admission and she refused.  When I saw
her today, she was on a ventilator, intubated.  She was on fentanyl drip, but
easily arousable.  When I tried to talk to her, she followed commands to me,
moved her extremities.  When I asked her if she was short of breath since the
last few days and she was wheezy, she shook her head yes.  Upon presentation to
the ER, she was in significant distress.  She had to be intubated, and she had
IV access placed.
 
Reviewing her record indicated she had multiple hospitalizations at this
facility.  She has a diagnosis of COPD.  Also, there were previous episodes of
overdose on medication including opiates, and she was here also with aspiration
pneumonia in the past.  Her chest x-ray showing right lower lobe infiltrates at
this point, suspicious for aspiration.
 
PAST MEDICAL HISTORY:  History of narcotic use for neck and shoulder pain,
history of spinal fusion in the past, history of right ulnar nerve injury. 
History of arrhythmia, status post ablation, history of DVT, history of
bronchial asthma, history of gastroesophageal reflux disease, history of
degenerative joint disease.  She has history of stimulator placement in the
right hip.  History of removal of this stimulator, history of pancreatitis.
 
SOCIAL HISTORY:  Apparently, she continues to smoke.  Per the record, she smokes
up to 2 packs a day.  No history of heavy alcohol use, but she has the narcotic
use at home.
 
ALLERGIES:  TYLENOL, FLEXERIL, CHANTIX AND ULTRAM.
 
REVIEW OF SYSTEMS:  At this point, not obtainable due to the patient's
condition.
 
PAST SURGICAL HISTORY:  She had right arm surgery in the past with gallbladder
removal and cardiac ablation.
 
 
 
[place] [address]                    CONSULTATION                  
_______________________________________________________________________________
 
Name:       JUSTIN MACDONALD           Room:           [address]    ADM [place]  
M.R.#:  [id number]      Account #:      [id number]  
Admission:  11/18/18     Attend Phys:    BURAK Burgess
Discharge:               Date of Birth:  [birth year]  
         Report #: [id number]
                                                                     [id number]  
_______________________________________________________________________________
 
FAMILY HISTORY:  Not obtainable at this point.
 
PHYSICAL EXAMINATION:
GENERAL:  She was on 50% FiO2 on the vent, intubated, sedated, not on
vasopressors.  She is on fentanyl, opens her eye when I stimulated her verbally,
and she moved her extremities on command, shook her head to questions.
VITAL SIGNS:  Her blood pressure is 111/51, breathing 16 times per minute,
temperature 37.1, O2 saturation 100%.
HEENT:  Head normocephalic, atraumatic.  Pupils reactive to light.  Not pale, no
jaundice.  External ear looks healthy and normal.
NECK:  Supple.  No palpable lymph node.  No palpable thyroid.  Trachea is
central.
ORAL CAVITY:  Intubated.  Moist mucous membrane.
CHEST:  Diminished air movement bilaterally, especially at the right lung base
with some rhonchi in the right lung base.  I did hear wheezes bilaterally. 
Nontender chest.
HEART:  S1, S2, no murmur.
ABDOMEN:  Benign, soft, lax, nontender, positive bowel sounds.  No masses felt.
EXTREMITIES:  Lower extremities, no signs of DVT, no calf tenderness, no edema.
PSYCHIATRIC:  Mood and affect could not be evaluated.
LYMPHATICS:  No palpable lymph node.
SKIN:  Normal for age and race.  No rash.
 
LABORATORY DATA:  Her chest x-ray post-intubation showed the tube in good
position, but there is infiltrate in the right lower lobe.  Her white blood
count 21,000, with a platelet of 165, hemoglobin 12.5.  ABGs before intubation
were noted, 7.33/40/56 on room air.  The followup ABG is pending at this point. 
Her creatinine is 1.2, potassium 3.3, sodium 146.  Her BNP also was noted to be
elevated.
 
CURRENT MEDICATIONS:  Reviewed per documentation within the "[employer].
 
Her last echocardiogram in the record was back in 2017 that demonstrated
ejection fraction 65%.
 
Her pulmonary artery systolic pressure was 45 mmHg.
 
IMPRESSION:
1.  Acute-on-chronic hypoxic respiratory failure.
2.  Bronchospasm.
3.  Chronic obstructive pulmonary disease.
4.  Drug overdose.
5.  Mental status change.
6.  Unintentional drug overdose.
7.  Smoker.
 
 
 
[place] [address]                    CONSULTATION                  
_______________________________________________________________________________
 
Name:       JUSTIN MACDONALD           Room:           [address] [place]  
[facility].#:  [id number]      Account #:      [id number]  
Admission:  11/18/18     Attend Phys:    BURAK Burgess
Discharge:               Date of Birth:  [birth year]  
         Report #: [id number]
                                                                     [id number]  
_______________________________________________________________________________
8.  Elevated BNP.
9.  Pulmonary hypertension.
 
At this point, I would continue the patient for at least another 24 hours on the
vent.  She will be on antibiotics for aspiration pneumonia.  Currently, she is
comfortable with the current plan of sedation with Versed and fentanyl.  We will
increase the frequency of her nebulization treatments.  I did hear wheezes on
examination, so I will start her on steroids.  We will do a followup chest x-ray
and ABGs.  We will give her a weaning trial tomorrow and see how she does and
the decision regarding extubation will depend on her trial parameters and ABGs.
 
However, with elevated BNP, she might benefit from diuresis before extubation.
 
We will follow along with you.  Thank you for the consult.
 
 
 
 
 
 
 
 
 
 
 
 
 
 
 
 
 
 
 
 
 
 
 
 
 
 
 
 
 
 
<ELECTRONICALLY SIGNED>
                                        By:  Tania Mo MD              
11/19/18     1120
D: 11/18/18 0803_______________________________________
T: 11/18/18 0937Tania Mo MD                 /nt

## 2024-09-17 NOTE — NUR
POOR PROGESSION TOWARDS GOALS, REPETITIVE PHRASE "MY GOD" WITH TACTILE STIMULI
LETHARGIC, EASILY AROUSABLE TO TACTILE STIMULI WITH EYES CLOSED MOST OF SHIFT,
NOT FOLLOWNG SIMPLE COMMANDS, DR BARRIENTOS UPDATED THIS AM, DR BARRIENTOS TO SEE PT FOR
FURTHER EVALUATION THIS AM, LOW GRADE TEMP HIGHEST 99.6 AXILLARY, REFUSING
ORAL CARE, CLAMPS MOUTH SHUT WITH ATTEMPTS TO SWAB MOUTH, SECOND DEGREE AVB
TYPE 1 PART OF SHIFT HR 50'S, CONVERTED NSR WITH FIRST DEGREE AVB AND BBB THIS
AM HR 70'S-80'S, REMAINS NPO, NO ADVERSE EFFECTS NOTED IV ANTIBIOTICS NOTED,
REPOSITIONING Q2 AND PRN FOR COMFORT AND PROMOTE SKIN INTEGRITY. BED ALARM ON
FOR SAFETY. Spoke w/ pt. Pt stated she is flying out on 9.19 and will be back on 9.23. Is there a date and time pt can have for when she comes back. Please advise